# Patient Record
Sex: MALE | Race: WHITE | NOT HISPANIC OR LATINO | URBAN - METROPOLITAN AREA
[De-identification: names, ages, dates, MRNs, and addresses within clinical notes are randomized per-mention and may not be internally consistent; named-entity substitution may affect disease eponyms.]

---

## 2024-12-03 ENCOUNTER — OFFICE VISIT (OUTPATIENT)
Dept: URGENT CARE | Age: 39
End: 2024-12-03
Payer: COMMERCIAL

## 2024-12-03 VITALS
HEART RATE: 59 BPM | TEMPERATURE: 98.1 F | SYSTOLIC BLOOD PRESSURE: 127 MMHG | OXYGEN SATURATION: 97 % | DIASTOLIC BLOOD PRESSURE: 87 MMHG

## 2024-12-03 DIAGNOSIS — R05.1 ACUTE COUGH: Primary | ICD-10-CM

## 2024-12-03 RX ORDER — AZITHROMYCIN 250 MG/1
TABLET, FILM COATED ORAL
Qty: 6 TABLET | Refills: 0 | Status: SHIPPED | OUTPATIENT
Start: 2024-12-03 | End: 2024-12-08

## 2024-12-03 ASSESSMENT — ENCOUNTER SYMPTOMS
SORE THROAT: 1
RHINORRHEA: 1
TROUBLE SWALLOWING: 0
COUGH: 1
DIARRHEA: 0
PALPITATIONS: 0
WHEEZING: 0
FEVER: 0
NAUSEA: 0
CONSTIPATION: 0
SHORTNESS OF BREATH: 0
CHILLS: 0
CHEST TIGHTNESS: 0

## 2024-12-03 NOTE — PROGRESS NOTES
Subjective   Patient ID: Selvin Baumann is a 39 y.o. male. They present today with a chief complaint of Cough (Chest congestion over 1 week ).    History of Present Illness  Patient presents with over one week of ongoing cough, runny nose, congestion, chest congestion, and cough is now productive. Denies fever, chills, sweats. Denies n/v/d. Denies chest pain, sob.   Denies history of asthma, copd.       Cough  Associated symptoms include postnasal drip, rhinorrhea and a sore throat. Pertinent negatives include no chest pain, chills, ear pain, fever, rash, shortness of breath or wheezing.       Past Medical History  Allergies as of 12/03/2024 - Reviewed 12/03/2024   Allergen Reaction Noted    Amoxicillin Unknown 11/27/2015       (Not in a hospital admission)       No past medical history on file.    No past surgical history on file.         Review of Systems  Review of Systems   Constitutional:  Negative for chills and fever.   HENT:  Positive for congestion, postnasal drip, rhinorrhea and sore throat. Negative for ear pain and trouble swallowing.    Respiratory:  Positive for cough. Negative for chest tightness, shortness of breath and wheezing.    Cardiovascular:  Negative for chest pain and palpitations.   Gastrointestinal:  Negative for constipation, diarrhea and nausea.   Skin:  Negative for rash.                                  Objective    Vitals:    12/03/24 1225   BP: 127/87   Pulse: 59   Temp: 36.7 °C (98.1 °F)   SpO2: 97%     No LMP for male patient.    Physical Exam  Constitutional:       General: He is not in acute distress.     Appearance: He is not toxic-appearing.   HENT:      Right Ear: Tympanic membrane and external ear normal.      Left Ear: Tympanic membrane and external ear normal.      Nose: Congestion present.      Right Sinus: No frontal sinus tenderness.      Left Sinus: No frontal sinus tenderness.      Mouth/Throat:      Mouth: Mucous membranes are moist. No oral lesions.      Pharynx:  Postnasal drip present. No oropharyngeal exudate or posterior oropharyngeal erythema.   Eyes:      Pupils: Pupils are equal, round, and reactive to light.   Cardiovascular:      Rate and Rhythm: Normal rate and regular rhythm.   Pulmonary:      Effort: Pulmonary effort is normal. No respiratory distress.      Breath sounds: Normal breath sounds. No wheezing.   Musculoskeletal:      Cervical back: Normal range of motion.   Neurological:      Mental Status: He is alert.         Procedures    Point of Care Test & Imaging Results from this visit  No results found for this visit on 12/03/24.   No results found.    Diagnostic study results (if any) were reviewed by Isha Hernandez PA-C.    Assessment/Plan   Allergies, medications, history, and pertinent labs/EKGs/Imaging reviewed by Isha Hernandez PA-C.     Medical Decision Making  MDM- Signs, symptoms, and examination, consistent with acute cough, suspicion for bacterial lower respiratory infection vs rhinosinusitis. No evidence of sepsis or acute respiratory distress at this time. Will treat with appropriate antibiotics for age group/risk factors. Patient is advised if symptoms change or worsen go to ED for further evaluation and care. Otherwise follow-up with family doctor for recheck. Patient verbalized understanding and agrees with plan.       Orders and Diagnoses  Diagnoses and all orders for this visit:  Acute cough  -     azithromycin (Zithromax) 250 mg tablet; Take 2 tabs (500 mg) by mouth today, than 1 daily for 4 days.      Medical Admin Record      Patient disposition: Home    Electronically signed by Isha Hernandez PA-C  12:43 PM